# Patient Record
Sex: MALE | Race: WHITE
[De-identification: names, ages, dates, MRNs, and addresses within clinical notes are randomized per-mention and may not be internally consistent; named-entity substitution may affect disease eponyms.]

---

## 2018-05-25 ENCOUNTER — HOSPITAL ENCOUNTER (EMERGENCY)
Dept: HOSPITAL 54 - ER | Age: 53
Discharge: HOME | End: 2018-05-25
Payer: COMMERCIAL

## 2018-05-25 VITALS — BODY MASS INDEX: 28.25 KG/M2 | HEIGHT: 67 IN | WEIGHT: 180 LBS

## 2018-05-25 VITALS — DIASTOLIC BLOOD PRESSURE: 74 MMHG | SYSTOLIC BLOOD PRESSURE: 133 MMHG

## 2018-05-25 DIAGNOSIS — F30.9: ICD-10-CM

## 2018-05-25 DIAGNOSIS — G40.909: ICD-10-CM

## 2018-05-25 DIAGNOSIS — Z13.89: Primary | ICD-10-CM

## 2018-05-25 LAB
ALBUMIN SERPL BCP-MCNC: 4.6 G/DL (ref 3.4–5)
ALP SERPL-CCNC: 58 U/L (ref 46–116)
ALT SERPL W P-5'-P-CCNC: 32 U/L (ref 12–78)
AST SERPL W P-5'-P-CCNC: 20 U/L (ref 15–37)
BASOPHILS # BLD AUTO: 0 /CMM (ref 0–0.2)
BASOPHILS NFR BLD AUTO: 0.4 % (ref 0–2)
BILIRUB SERPL-MCNC: 0.7 MG/DL (ref 0.2–1)
BUN SERPL-MCNC: 14 MG/DL (ref 7–18)
CALCIUM SERPL-MCNC: 9.7 MG/DL (ref 8.5–10.1)
CHLORIDE SERPL-SCNC: 103 MMOL/L (ref 98–107)
CO2 SERPL-SCNC: 21 MMOL/L (ref 21–32)
CREAT SERPL-MCNC: 1 MG/DL (ref 0.6–1.3)
EOSINOPHIL NFR BLD AUTO: 0.2 % (ref 0–6)
GLUCOSE SERPL-MCNC: 106 MG/DL (ref 74–106)
HCT VFR BLD AUTO: 45 % (ref 39–51)
HGB BLD-MCNC: 15.4 G/DL (ref 13.5–17.5)
LYMPHOCYTES NFR BLD AUTO: 1.8 /CMM (ref 0.8–4.8)
LYMPHOCYTES NFR BLD AUTO: 19.1 % (ref 20–44)
MCHC RBC AUTO-ENTMCNC: 35 G/DL (ref 31–36)
MCV RBC AUTO: 89 FL (ref 80–96)
MONOCYTES NFR BLD AUTO: 0.8 /CMM (ref 0.1–1.3)
MONOCYTES NFR BLD AUTO: 8.5 % (ref 2–12)
NEUTROPHILS # BLD AUTO: 6.6 /CMM (ref 1.8–8.9)
NEUTROPHILS NFR BLD AUTO: 71.8 % (ref 43–81)
PLATELET # BLD AUTO: 366 /CMM (ref 150–450)
POTASSIUM SERPL-SCNC: 3.8 MMOL/L (ref 3.5–5.1)
PROT SERPL-MCNC: 8.6 G/DL (ref 6.4–8.2)
RBC # BLD AUTO: 5.03 MIL/UL (ref 4.5–6)
RDW COEFFICIENT OF VARIATION: 12.3 (ref 11.5–15)
SODIUM SERPL-SCNC: 136 MMOL/L (ref 136–145)
WBC NRBC COR # BLD AUTO: 9.2 K/UL (ref 4.3–11)

## 2018-05-25 PROCEDURE — 99284 EMERGENCY DEPT VISIT MOD MDM: CPT

## 2018-05-25 PROCEDURE — 80053 COMPREHEN METABOLIC PANEL: CPT

## 2018-05-25 PROCEDURE — 85025 COMPLETE CBC W/AUTO DIFF WBC: CPT

## 2018-05-25 PROCEDURE — Z7610: HCPCS

## 2018-05-25 PROCEDURE — A4606 OXYGEN PROBE USED W OXIMETER: HCPCS

## 2018-05-25 PROCEDURE — 82542 COL CHROMOTOGRAPHY QUAL/QUAN: CPT

## 2018-05-25 PROCEDURE — 36415 COLL VENOUS BLD VENIPUNCTURE: CPT

## 2018-05-25 NOTE — NUR
PETIT MAL SEIZURE X 1 EPISODDE 4 DAYS PTA, NAD NOTED, VSS, RESP EVEN AND 
UNLABORED, PT WAS PUT ON MONITOR, MD AT BS.

## 2024-08-25 ENCOUNTER — HOSPITAL ENCOUNTER (EMERGENCY)
Dept: HOSPITAL 54 - ER | Age: 59
Discharge: HOME | End: 2024-08-25
Payer: COMMERCIAL

## 2024-08-25 VITALS — OXYGEN SATURATION: 99 % | SYSTOLIC BLOOD PRESSURE: 133 MMHG | DIASTOLIC BLOOD PRESSURE: 71 MMHG

## 2024-08-25 VITALS — WEIGHT: 184 LBS | HEIGHT: 69 IN | BODY MASS INDEX: 27.25 KG/M2

## 2024-08-25 VITALS — TEMPERATURE: 98 F
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
## 2024-08-25 DIAGNOSIS — S61.011A: Primary | ICD-10-CM
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
## 2024-08-25 DIAGNOSIS — G40.909: ICD-10-CM

## 2024-08-25 PROCEDURE — 99283 EMERGENCY DEPT VISIT LOW MDM: CPT

## 2024-08-25 PROCEDURE — 90471 IMMUNIZATION ADMIN: CPT

## 2024-08-25 PROCEDURE — 12001 RPR S/N/AX/GEN/TRNK 2.5CM/<: CPT

## 2024-08-25 PROCEDURE — 90715 TDAP VACCINE 7 YRS/> IM: CPT

## 2024-08-25 RX ADMIN — LIDOCAINE HYDROCHLORIDE ONE MG: 10 INJECTION, SOLUTION INFILTRATION; PERINEURAL at 15:17

## 2024-08-25 RX ADMIN — CLOSTRIDIUM TETANI TOXOID ANTIGEN (FORMALDEHYDE INACTIVATED), CORYNEBACTERIUM DIPHTHERIAE TOXOID ANTIGEN (FORMALDEHYDE INACTIVATED), BORDETELLA PERTUSSIS TOXOID ANTIGEN (GLUTARALDEHYDE INACTIVATED), BORDETELLA PERTUSSIS FILAMENTOUS HEMAGGLUTININ ANTIGEN (FORMALDEHYDE INACTIVATED), BORDETELLA PERTUSSIS PERTACTIN ANTIGEN, AND BORDETELLA PERTUSSIS FIMBRIAE 2/3 ANTIGEN ONE ML: 5; 2; 2.5; 5; 3; 5 INJECTION, SUSPENSION INTRAMUSCULAR at 15:24

## 2024-09-04 ENCOUNTER — HOSPITAL ENCOUNTER (EMERGENCY)
Dept: HOSPITAL 54 - ER | Age: 59
Discharge: HOME | End: 2024-09-04
Payer: COMMERCIAL

## 2024-09-04 VITALS — TEMPERATURE: 97.7 F | DIASTOLIC BLOOD PRESSURE: 72 MMHG | SYSTOLIC BLOOD PRESSURE: 118 MMHG

## 2024-09-04 VITALS — OXYGEN SATURATION: 99 %

## 2024-09-04 VITALS — BODY MASS INDEX: 23.7 KG/M2 | HEIGHT: 69 IN | WEIGHT: 160 LBS

## 2024-09-04 DIAGNOSIS — G40.909: ICD-10-CM

## 2024-09-04 DIAGNOSIS — S61.011D: Primary | ICD-10-CM

## 2024-09-04 DIAGNOSIS — Z48.02: ICD-10-CM
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
## 2024-09-06 ENCOUNTER — HOSPITAL ENCOUNTER (EMERGENCY)
Dept: HOSPITAL 54 - ER | Age: 59
Discharge: HOME | End: 2024-09-06
Payer: COMMERCIAL

## 2024-09-06 VITALS — SYSTOLIC BLOOD PRESSURE: 136 MMHG | DIASTOLIC BLOOD PRESSURE: 69 MMHG | OXYGEN SATURATION: 100 % | TEMPERATURE: 98.1 F

## 2024-09-06 VITALS — WEIGHT: 182 LBS | HEIGHT: 69 IN | BODY MASS INDEX: 26.96 KG/M2
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
## 2024-09-06 DIAGNOSIS — Z48.00: ICD-10-CM

## 2024-09-06 DIAGNOSIS — S61.411D: Primary | ICD-10-CM

## 2024-09-06 DIAGNOSIS — G40.909: ICD-10-CM
